# Patient Record
Sex: FEMALE | Race: OTHER | Employment: FULL TIME | URBAN - METROPOLITAN AREA
[De-identification: names, ages, dates, MRNs, and addresses within clinical notes are randomized per-mention and may not be internally consistent; named-entity substitution may affect disease eponyms.]

---

## 2019-05-22 ENCOUNTER — HOSPITAL ENCOUNTER (EMERGENCY)
Age: 19
Discharge: HOME | End: 2019-05-22
Attending: EMERGENCY MEDICINE
Payer: SELF-PAY

## 2019-05-22 VITALS
WEIGHT: 200 LBS | HEART RATE: 63 BPM | DIASTOLIC BLOOD PRESSURE: 69 MMHG | SYSTOLIC BLOOD PRESSURE: 107 MMHG | RESPIRATION RATE: 18 BRPM | TEMPERATURE: 98.7 F | OXYGEN SATURATION: 100 %

## 2019-05-22 DIAGNOSIS — R21 RASH AND OTHER NONSPECIFIC SKIN ERUPTION: Primary | ICD-10-CM

## 2019-05-22 PROCEDURE — 74011250636 HC RX REV CODE- 250/636: Performed by: EMERGENCY MEDICINE

## 2019-05-22 PROCEDURE — 74011250637 HC RX REV CODE- 250/637: Performed by: EMERGENCY MEDICINE

## 2019-05-22 PROCEDURE — 96372 THER/PROPH/DIAG INJ SC/IM: CPT

## 2019-05-22 PROCEDURE — 99283 EMERGENCY DEPT VISIT LOW MDM: CPT

## 2019-05-22 RX ORDER — METHYLPREDNISOLONE 4 MG/1
TABLET ORAL
Qty: 1 DOSE PACK | Refills: 1 | Status: SHIPPED | OUTPATIENT
Start: 2019-05-22

## 2019-05-22 RX ORDER — FAMOTIDINE 20 MG/1
20 TABLET, FILM COATED ORAL
Status: COMPLETED | OUTPATIENT
Start: 2019-05-22 | End: 2019-05-22

## 2019-05-22 RX ADMIN — METHYLPREDNISOLONE SODIUM SUCCINATE 125 MG: 125 INJECTION, POWDER, FOR SOLUTION INTRAMUSCULAR; INTRAVENOUS at 21:41

## 2019-05-22 RX ADMIN — FAMOTIDINE 20 MG: 20 TABLET ORAL at 21:41

## 2019-05-22 NOTE — LETTER
[facility] EMERGENCY DEPT 
[facility] [address] [id number] 
[phone] Work/School Note Date: 5/22/2019 To Whom It May concern: 
 
Mini Mehta was seen and treated today in the emergency room by the following provider(s): 
Attending Provider: Kim Seip, MD 
Physician Assistant: JULIAN Arciniega. Mini Mehta may return to work on 5/24/2019. Sincerely, Heidi London

## 2019-05-23 NOTE — ED TRIAGE NOTES
Alert and ambulatory pt arrived through triage c/o eye swelling, rash, and itchiness all over the body started this morning. Pt denies SOB and vision change. Do not suspect what caused the symptoms. Pt stated took benadryl around 9am this morning and symptom got worsen.

## 2019-05-23 NOTE — ED PROVIDER NOTES
EMERGENCY DEPARTMENT HISTORY AND PHYSICAL EXAM    Date: 2019  Patient Name: Sharmin Tang    History of Presenting Illness     Chief Complaint   Patient presents with    Rash         History Provided By: Patient      Additional History (Context): Sharmin Tang is a 22 y.o. female with asthma and Eczema who presents with sudden onset of pruritic rash to arms and face chest.  Event occurred this morning. She took Benadryl. Seem to make it worse. Denies wheezing shortness of breath or stridor. Cannot figure out what her exacerbate is. Says she has eczema but this feels more generalized than where she would typically have an eczematous flare. Last menstrual 420. Says she is not pregnant due to her oral contraception. PCP: None    Current Facility-Administered Medications   Medication Dose Route Frequency Provider Last Rate Last Dose    methylPREDNISolone (PF) (Solu-MEDROL) injection 125 mg  125 mg IntraMUSCular NOW Marilu Bowers PA        famotidine (PEPCID) tablet 20 mg  20 mg Oral NOW Marilu Bowers PA         Current Outpatient Medications   Medication Sig Dispense Refill    methylPREDNISolone (MEDROL, KARLENE,) 4 mg tablet Per dose pack instructions 1 Dose Pack 1       Past History     Past Medical History:  Past Medical History:   Diagnosis Date    Asthma     Eczema        Past Surgical History:  History reviewed. No pertinent surgical history. Family History:  History reviewed. No pertinent family history. Social History:  Social History     Tobacco Use    Smoking status: Former Smoker     Last attempt to quit: 2018     Years since quittin.0    Smokeless tobacco: Never Used   Substance Use Topics    Alcohol use: Never     Frequency: Never    Drug use: Never       Allergies: Allergies   Allergen Reactions    Egg Derived Rash    Shellfish Derived Angioedema         Review of Systems   Review of Systems   Respiratory: Negative for shortness of breath.     Gastrointestinal: Negative for abdominal pain. Skin: Positive for rash. All Other Systems Negative  Physical Exam     Vitals:    05/22/19 2121   BP: 107/69   Pulse: 63   Resp: 18   Temp: 98.7 °F (37.1 °C)   SpO2: 100%   Weight: 90.7 kg (200 lb)     Physical Exam   Constitutional: She is oriented to person, place, and time. She appears well-developed. HENT:   Head: Normocephalic and atraumatic. Normal size uvula. No intraoral lesions. Eyes: Pupils are equal, round, and reactive to light. Neck: No JVD present. No tracheal deviation present. No thyromegaly present. Cardiovascular: Normal rate, regular rhythm and normal heart sounds. Exam reveals no gallop and no friction rub. No murmur heard. Pulmonary/Chest: Effort normal and breath sounds normal. No stridor. No respiratory distress. She has no wheezes. She has no rales. She exhibits no tenderness. Abdominal: Soft. She exhibits no distension and no mass. There is no tenderness. There is no rebound and no guarding. Musculoskeletal: She exhibits no edema or tenderness. Lymphadenopathy:     She has no cervical adenopathy. Neurological: She is alert and oriented to person, place, and time. Skin: Skin is warm and dry. Rash noted. No erythema. No pallor. Fine maculopapular rash on chest face and bilateral upper extremities. No excoriations. Psychiatric: She has a normal mood and affect. Her behavior is normal. Thought content normal.   Nursing note and vitals reviewed. Diagnostic Study Results     Labs -   No results found for this or any previous visit (from the past 12 hour(s)). Radiologic Studies -   No orders to display     CT Results  (Last 48 hours)    None        CXR Results  (Last 48 hours)    None            Medical Decision Making   I am the first provider for this patient. I reviewed the vital signs, available nursing notes, past medical history, past surgical history, family history and social history.     Vital Signs-Reviewed the patient's vital signs. Records Reviewed: Nursing Notes    Procedures:  Procedures    Provider Notes (Medical Decision Making): Treat with IM injection of steroid, Pepcid here. Home with steroid pack with 1 refill. Follow-up with her PCP. MED RECONCILIATION:  Current Facility-Administered Medications   Medication Dose Route Frequency    methylPREDNISolone (PF) (Solu-MEDROL) injection 125 mg  125 mg IntraMUSCular NOW    famotidine (PEPCID) tablet 20 mg  20 mg Oral NOW     Current Outpatient Medications   Medication Sig    methylPREDNISolone (MEDROL, KARLENE,) 4 mg tablet Per dose pack instructions       Disposition:  home    DISCHARGE NOTE:   9:33 PM    Pt has been reexamined. Patient has no new complaints, changes, or physical findings. Care plan outlined and precautions discussed. Results of exam were reviewed with the patient. All medications were reviewed with the patient; will d/c home with steroid. All of pt's questions and concerns were addressed. Patient was instructed and agrees to follow up with PCP, as well as to return to the ED upon further deterioration. Patient is ready to go home. Follow-up Information     Follow up With Specialties Details Why Contact Milena Cunha  Schedule an appointment as soon as possible for a visit in 2 days  [phone]  [phone]    [facility] EMERGENCY DEPT Emergency Medicine  If symptoms worsen return immediately [address]  [phone]          Current Discharge Medication List      START taking these medications    Details   methylPREDNISolone (MEDROL, KARLENE,) 4 mg tablet Per dose pack instructions  Qty: 1 Dose Pack, Refills: 1               Diagnosis     Clinical Impression:   1.  Rash and other nonspecific skin eruption

## 2019-05-23 NOTE — DISCHARGE INSTRUCTIONS
Patient Education        Rash: Care Instructions  Your Care Instructions  A rash is any irritation or inflammation of the skin. Rashes have many possible causes, including allergy, infection, illness, heat, and emotional stress. Follow-up care is a key part of your treatment and safety. Be sure to make and go to all appointments, and call your doctor if you are having problems. It's also a good idea to know your test results and keep a list of the medicines you take. How can you care for yourself at home? · Wash the area with water only. Soap can make dryness and itching worse. Pat dry. · Put cold, wet cloths on the rash to reduce itching. · Keep cool, and stay out of the sun. · Leave the rash open to the air as much of the time as possible. · Sometimes petroleum jelly (Vaseline) can help relieve the discomfort caused by a rash. A moisturizing lotion, such as Cetaphil, also may help. Calamine lotion may help for rashes caused by contact with something (such as a plant or soap) that irritated the skin. Use it 3 or 4 times a day. · If your doctor prescribed a cream, use it as directed. If your doctor prescribed medicine, take it exactly as directed. · If your rash itches so badly that it interferes with your normal activities, take an over-the-counter antihistamine, such as diphenhydramine (Benadryl) or loratadine (Claritin). Read and follow all instructions on the label. When should you call for help? Call your doctor now or seek immediate medical care if:    · You have signs of infection, such as:  ? Increased pain, swelling, warmth, or redness. ? Red streaks leading from the area. ? Pus draining from the area. ? A fever.     · You have joint pain along with the rash.    Watch closely for changes in your health, and be sure to contact your doctor if:    · Your rash is changing or getting worse.  For example, call if you have pain along with the rash, the rash is spreading, or you have new blisters.     · You do not get better after 1 week. Where can you learn more? Go to http://luzmaria-ellen.info/. Enter I002 in the search box to learn more about \"Rash: Care Instructions. \"  Current as of: April 17, 2018  Content Version: 11.9  © 3772-6602 [employer]. Care instructions adapted under license by [employer] (which disclaims liability or warranty for this information). If you have questions about a medical condition or this instruction, always ask your healthcare professional. [address] any warranty or liability for your use of this information.

## 2020-08-22 ENCOUNTER — HOSPITAL ENCOUNTER (EMERGENCY)
Age: 20
Discharge: HOME | End: 2020-08-22
Attending: EMERGENCY MEDICINE

## 2020-08-22 VITALS
RESPIRATION RATE: 18 BRPM | TEMPERATURE: 98.2 F | WEIGHT: 204 LBS | SYSTOLIC BLOOD PRESSURE: 113 MMHG | BODY MASS INDEX: 32.02 KG/M2 | HEART RATE: 68 BPM | DIASTOLIC BLOOD PRESSURE: 72 MMHG | HEIGHT: 67 IN | OXYGEN SATURATION: 97 %

## 2020-08-22 DIAGNOSIS — H20.9 IRITIS: Primary | ICD-10-CM

## 2020-08-22 PROCEDURE — 74011000250 HC RX REV CODE- 250: Performed by: EMERGENCY MEDICINE

## 2020-08-22 PROCEDURE — 99283 EMERGENCY DEPT VISIT LOW MDM: CPT

## 2020-08-22 RX ORDER — PROPARACAINE HYDROCHLORIDE 5 MG/ML
2 SOLUTION/ DROPS OPHTHALMIC
Status: COMPLETED | OUTPATIENT
Start: 2020-08-22 | End: 2020-08-22

## 2020-08-22 RX ORDER — KETOROLAC TROMETHAMINE 5 MG/ML
1 SOLUTION OPHTHALMIC 4 TIMES DAILY
Qty: 1 BOTTLE | Refills: 0 | Status: SHIPPED | OUTPATIENT
Start: 2020-08-22 | End: 2020-09-01

## 2020-08-22 RX ADMIN — FLUORESCEIN SODIUM 1 STRIP: 1 STRIP OPHTHALMIC at 19:31

## 2020-08-22 RX ADMIN — PROPARACAINE HYDROCHLORIDE 2 DROP: 5 SOLUTION/ DROPS OPHTHALMIC at 19:32

## 2020-08-22 NOTE — ED TRIAGE NOTES
Pt states hit her eye with her hand 2 days ago accidentally and now with redness, swelling and clear drainage. Pt denies contact wear. Pt is light sensitive.

## 2020-08-22 NOTE — DISCHARGE INSTRUCTIONS
Patient Education        Iritis: Care Instructions  Your Care Instructions     Iritis is an inflammation of the colored part of the eye. This part of the eye is called the iris. Iritis can cause redness and pain. It can make your eyes more sensitive to light. And it may make your pupils different sizes. Iritis is most often treated with prescription eyedrops. Treatment can usually prevent long-term problems with vision. Iritis usually lasts 6 to 8 weeks. You will need follow-up care with an eye doctor (ophthalmologist). Anterior uveitis (say \"you-vee-EYE-tus\") and iridocyclitis (say \"nrp-ov-pll-stuart-KLY-tus\") are other terms used to refer to this problem. Follow-up care is a key part of your treatment and safety. Be sure to make and go to all appointments, and call your doctor if you are having problems. It's also a good idea to know your test results and keep a list of the medicines you take. How can you care for yourself at home? · If the doctor gave you eyedrops, use them exactly as directed. Use the medicine for as long as instructed, even if your eye starts to look better soon. Call your doctor if you think you are having a problem with your eyedrops. Wash your hands well before and after you put in eyedrops. · To put in eyedrops or ointment:  ? Tilt your head back, and pull your lower eyelid down with one finger. ? Drop or squirt the medicine inside the lower lid. ? Close your eye for 30 to 60 seconds to let the drops or ointment move around. ? Do not touch the ointment or dropper tip to your eyelashes or any other surface. · Take an over-the-counter pain medicine, such as acetaminophen (Tylenol), ibuprofen (Advil, Motrin), or naproxen (Aleve). Read and follow all instructions on the label. · Make sure you go to all of your follow-up appointments. You will need a complete eye exam from an eye doctor. When should you call for help?    Call your doctor now or seek immediate medical care if:  · You have new or increasing eye pain. · You have vision changes in either eye. Watch closely for changes in your health, and be sure to contact your doctor if:  · You have new or worse symptoms. · You do not get better as expected. Where can you learn more? Go to http://luzmaria-ellen.info/  Enter B112 in the search box to learn more about \"Iritis: Care Instructions. \"  Current as of: December 18, 2019               Content Version: 12.5  © 2610-7633 [employer]. Care instructions adapted under license by [employer] (which disclaims liability or warranty for this information). If you have questions about a medical condition or this instruction, always ask your healthcare professional. [address] any warranty or liability for your use of this information.

## 2020-08-22 NOTE — ED PROVIDER NOTES
EMERGENCY DEPARTMENT HISTORY AND PHYSICAL EXAM    7:25 PM      Date: 2020  Patient Name: Torres Turcios    History of Presenting Illness     Chief Complaint   Patient presents with    Eye Pain         History Provided By: Patient    Chief Complaint: left eye pain, redness  Duration:  Days  Timing:  Acute  Location: left eye  Quality: Sharp  Severity: Moderate  Modifying Factors: worse with light  Associated Symptoms: denies any other associated signs or symptoms      Additional History (Context): Torres Turcios is a 20 y.o. female with No significant past medical history who presents with left eye pain and redness. 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 Reports increasing pain and light sensitivity. Positive excessive tearing but no discharge. Reports blurred vision in his left eye. Does have glasses but does not routinely wear them. Has not taken anything for this. No other complaints. Social: Denies tobacco, alcohol, occasional marijuana    PCP: None    Current Outpatient Medications   Medication Sig Dispense Refill    ketorolac (Acular) 0.5 % ophthalmic solution Administer 1 Drop to left eye four (4) times daily for 10 days. 1 Bottle 0    methylPREDNISolone (MEDROL, KARLENE,) 4 mg tablet Per dose pack instructions 1 Dose Pack 1       Past History     Past Medical History:  Past Medical History:   Diagnosis Date    Asthma     Eczema     Ill-defined condition     exzcema       Past Surgical History:  History reviewed. No pertinent surgical history. Family History:  History reviewed. No pertinent family history.     Social History:  Social History     Tobacco Use    Smoking status: Former Smoker     Last attempt to quit: 2018     Years since quittin.2    Smokeless tobacco: Never Used   Substance Use Topics    Alcohol use: Never     Frequency: Never    Drug use: Yes     Frequency: 2.0 times per week Types: Marijuana       Allergies: Allergies   Allergen Reactions    Egg Derived Rash    Shellfish Derived Angioedema         Review of Systems       Review of Systems   Constitutional: Negative for fever. Eyes: Positive for photophobia, pain, redness and visual disturbance. Negative for discharge and itching. All other systems reviewed and are negative. Physical Exam     Visit Vitals  /72 (BP 1 Location: Right arm, BP Patient Position: Sitting)   Pulse 68   Temp 98.2 °F (36.8 °C)   Resp 18   Ht 5' 7\" (1.702 m)   Wt 92.5 kg (204 lb)   LMP 08/15/2020 (Exact Date)   SpO2 97%   BMI 31.95 kg/m²         Physical Exam  Constitutional:       General: She is not in acute distress. Appearance: She is well-developed. HENT:      Head: Normocephalic and atraumatic. Eyes:      General: Lids are normal. Lids are everted, no foreign bodies appreciated. Vision grossly intact. Right eye: No discharge. Left eye: No foreign body or discharge. Extraocular Movements: Extraocular movements intact. Conjunctiva/sclera:      Left eye: Left conjunctiva is injected. No exudate or hemorrhage. Pupils: Pupils are equal, round, and reactive to light. Left eye: No corneal abrasion or fluorescein uptake. Gwendolyn exam negative. Slit lamp exam:     Left eye: Photophobia present. No corneal ulcer, foreign body or hyphema. Neck:      Musculoskeletal: Neck supple. Vascular: No JVD. Skin:     General: Skin is warm and dry. Findings: No erythema. Neurological:      Mental Status: She is alert and oriented to person, place, and time. Diagnostic Study Results     Labs -  No results found for this or any previous visit (from the past 12 hour(s)). Radiologic Studies -   No orders to display         Medical Decision Making   I am the first provider for this patient.     I reviewed the vital signs, available nursing notes, past medical history, past surgical history, family history and social history. Vital Signs-Reviewed the patient's vital signs. Pulse Oximetry Analysis -  97 on room air (Interpretation)nl       Records Reviewed: Nursing Notes (Time of Review: 7:25 PM)    ED Course: Progress Notes, Reevaluation, and Consults:      Provider Notes (Medical Decision Making): 79-year-old female presenting with left eye redness and pain. Exam showing no obvious abrasion or ulcer. Likely some iritis will prescribe topical NSAID. Discussed eye follow-up. Not consistent with glaucoma. Not consistent with globe rupture. Stable for GA home. Diagnosis     Clinical Impression:   1. Iritis        Disposition: discharged    Follow-up Information     Follow up With Specialties Details Why [phone]  Schedule an appointment as soon as possible for a visit in 1 week  ANTONI Mcallister [phone] [facility] EMERGENCY DEPT Emergency Medicine  As needed, If symptoms worsen [phone] [facility]           Patient's Medications   Start Taking    KETOROLAC (ACULAR) 0.5 % OPHTHALMIC SOLUTION    Administer 1 Drop to left eye four (4) times daily for 10 days.    Continue Taking    METHYLPREDNISOLONE (MEDROL, KARLENE,) 4 MG TABLET    Per dose pack instructions   These Medications have changed    No medications on file   Stop Taking    No medications on file     _______________________________

## 2020-08-22 NOTE — LETTER
[facility] EMERGENCY DEPT 
[address] 
[address] [facility] [id number] [phone] Work/School Note Date: 8/22/2020 To Whom It May concern: 
 
Marc Morales was seen and treated today in the emergency room by the following provider(s): 
Attending Provider: Gertrude Prader, DO. Marc Morales may return to work on 8/24/2020. Sincerely, Everardo Manuel DO

## 2021-03-30 ENCOUNTER — HOSPITAL ENCOUNTER (OUTPATIENT)
Dept: LAB | Age: 21
Discharge: HOME | End: 2021-03-30
Payer: COMMERCIAL

## 2021-03-30 PROCEDURE — 88175 CYTOPATH C/V AUTO FLUID REDO: CPT

## 2021-03-30 PROCEDURE — 87624 HPV HI-RISK TYP POOLED RSLT: CPT

## 2021-04-11 LAB
CYTOLOGIST CVX/VAG CYTO: ABNORMAL
CYTOLOGY CVX/VAG DOC THIN PREP: ABNORMAL
HPV APTIMA: POSITIVE
HPV REFLEX NOTE, HPVL19: ABNORMAL
Lab: ABNORMAL
PATH REPORT.FINAL DX SPEC: ABNORMAL
PATHOLOGIST CVX/VAG CYTO: ABNORMAL
PATHOLOGIST PROVIDED ICD10: ABNORMAL
RECOM F/U CVX/VAG CYTO: ABNORMAL
STAT OF ADQ CVX/VAG CYTO-IMP: ABNORMAL